# Patient Record
Sex: MALE | Race: OTHER
[De-identification: names, ages, dates, MRNs, and addresses within clinical notes are randomized per-mention and may not be internally consistent; named-entity substitution may affect disease eponyms.]

---

## 2018-01-29 NOTE — DIAGNOSTIC IMAGING REPORT
Indication: Reason For Exam: PREOP

 

Technique: 2 views of the chest

 

Comparison: none.

 

Findings: Lungs and pleural spaces are clear. Heart size is normal. Bones are

unremarkable..

 

Impression: No acute process

## 2018-02-01 ENCOUNTER — HOSPITAL ENCOUNTER (INPATIENT)
Dept: HOSPITAL 72 - SDSOVERFLO | Age: 63
LOS: 9 days | Discharge: HOME | DRG: 658 | End: 2018-02-10
Payer: COMMERCIAL

## 2018-02-01 VITALS — WEIGHT: 170 LBS | HEIGHT: 70 IN | BODY MASS INDEX: 24.34 KG/M2

## 2018-02-01 DIAGNOSIS — E27.9: ICD-10-CM

## 2018-02-01 DIAGNOSIS — C64.1: Primary | ICD-10-CM

## 2018-02-01 DIAGNOSIS — N40.0: ICD-10-CM

## 2018-02-01 PROCEDURE — 83735 ASSAY OF MAGNESIUM: CPT

## 2018-02-01 PROCEDURE — 87081 CULTURE SCREEN ONLY: CPT

## 2018-02-01 PROCEDURE — 86920 COMPATIBILITY TEST SPIN: CPT

## 2018-02-01 PROCEDURE — 85610 PROTHROMBIN TIME: CPT

## 2018-02-01 PROCEDURE — 80048 BASIC METABOLIC PNL TOTAL CA: CPT

## 2018-02-01 PROCEDURE — 81001 URINALYSIS AUTO W/SCOPE: CPT

## 2018-02-01 PROCEDURE — 86901 BLOOD TYPING SEROLOGIC RH(D): CPT

## 2018-02-01 PROCEDURE — 86900 BLOOD TYPING SEROLOGIC ABO: CPT

## 2018-02-01 PROCEDURE — 86850 RBC ANTIBODY SCREEN: CPT

## 2018-02-01 PROCEDURE — 36415 COLL VENOUS BLD VENIPUNCTURE: CPT

## 2018-02-01 PROCEDURE — 71046 X-RAY EXAM CHEST 2 VIEWS: CPT

## 2018-02-01 PROCEDURE — 84100 ASSAY OF PHOSPHORUS: CPT

## 2018-02-01 PROCEDURE — 85025 COMPLETE CBC W/AUTO DIFF WBC: CPT

## 2018-02-01 PROCEDURE — 94150 VITAL CAPACITY TEST: CPT

## 2018-02-01 PROCEDURE — 94003 VENT MGMT INPAT SUBQ DAY: CPT

## 2018-02-01 PROCEDURE — 80053 COMPREHEN METABOLIC PANEL: CPT

## 2018-02-01 PROCEDURE — 85730 THROMBOPLASTIN TIME PARTIAL: CPT

## 2018-02-05 LAB
ADD MANUAL DIFF: NO
APPEARANCE UR: CLEAR
APTT BLD: 32 SEC (ref 23–33)
APTT PPP: (no result) S
BASOPHILS NFR BLD AUTO: 0.5 % (ref 0–2)
EOSINOPHIL NFR BLD AUTO: 0.7 % (ref 0–3)
ERYTHROCYTE [DISTWIDTH] IN BLOOD BY AUTOMATED COUNT: 13 % (ref 11.6–14.8)
GLUCOSE UR STRIP-MCNC: NEGATIVE MG/DL
HCT VFR BLD CALC: 34.6 % (ref 42–52)
HGB BLD-MCNC: 11.1 G/DL (ref 14.2–18)
INR PPP: 1 (ref 0.9–1.1)
KETONES UR QL STRIP: NEGATIVE
LEUKOCYTE ESTERASE UR QL STRIP: NEGATIVE
LYMPHOCYTES NFR BLD AUTO: 18.5 % (ref 20–45)
MCV RBC AUTO: 88 FL (ref 80–99)
MONOCYTES NFR BLD AUTO: 9.9 % (ref 1–10)
NEUTROPHILS NFR BLD AUTO: 70.4 % (ref 45–75)
NITRITE UR QL STRIP: NEGATIVE
PH UR STRIP: 5 [PH] (ref 4.5–8)
PLATELET # BLD: 369 K/UL (ref 150–450)
PROT UR QL STRIP: NEGATIVE
RBC # BLD AUTO: 3.95 M/UL (ref 4.7–6.1)
SP GR UR STRIP: 1.01 (ref 1–1.03)
UROBILINOGEN UR-MCNC: NORMAL MG/DL (ref 0–1)
WBC # BLD AUTO: 4.9 K/UL (ref 4.8–10.8)

## 2018-02-06 LAB
ALBUMIN SERPL-MCNC: 3 G/DL (ref 3.4–5)
ALBUMIN/GLOB SERPL: 0.7 {RATIO} (ref 1–2.7)
ALP SERPL-CCNC: 75 U/L (ref 46–116)
ALT SERPL-CCNC: 15 U/L (ref 12–78)
ANION GAP SERPL CALC-SCNC: 8 MMOL/L (ref 5–15)
AST SERPL-CCNC: 16 U/L (ref 15–37)
BILIRUB SERPL-MCNC: 0.4 MG/DL (ref 0.2–1)
BUN SERPL-MCNC: 14 MG/DL (ref 7–18)
CALCIUM SERPL-MCNC: 9 MG/DL (ref 8.5–10.1)
CHLORIDE SERPL-SCNC: 102 MMOL/L (ref 98–107)
CO2 SERPL-SCNC: 29 MMOL/L (ref 21–32)
CREAT SERPL-MCNC: 0.9 MG/DL (ref 0.55–1.3)
GLOBULIN SER-MCNC: 4.6 G/DL
PHOSPHATE SERPL-MCNC: 3.2 MG/DL (ref 2.5–4.9)
POTASSIUM SERPL-SCNC: 4.3 MMOL/L (ref 3.5–5.1)
SODIUM SERPL-SCNC: 138 MMOL/L (ref 136–145)

## 2018-02-07 VITALS — DIASTOLIC BLOOD PRESSURE: 72 MMHG | SYSTOLIC BLOOD PRESSURE: 119 MMHG

## 2018-02-07 VITALS — SYSTOLIC BLOOD PRESSURE: 119 MMHG | DIASTOLIC BLOOD PRESSURE: 72 MMHG

## 2018-02-07 VITALS — SYSTOLIC BLOOD PRESSURE: 100 MMHG | DIASTOLIC BLOOD PRESSURE: 60 MMHG

## 2018-02-07 VITALS — SYSTOLIC BLOOD PRESSURE: 133 MMHG | DIASTOLIC BLOOD PRESSURE: 68 MMHG

## 2018-02-07 VITALS — DIASTOLIC BLOOD PRESSURE: 79 MMHG | SYSTOLIC BLOOD PRESSURE: 133 MMHG

## 2018-02-07 VITALS — DIASTOLIC BLOOD PRESSURE: 82 MMHG | SYSTOLIC BLOOD PRESSURE: 129 MMHG

## 2018-02-07 VITALS — SYSTOLIC BLOOD PRESSURE: 131 MMHG | DIASTOLIC BLOOD PRESSURE: 73 MMHG

## 2018-02-07 VITALS — DIASTOLIC BLOOD PRESSURE: 70 MMHG | SYSTOLIC BLOOD PRESSURE: 123 MMHG

## 2018-02-07 VITALS — SYSTOLIC BLOOD PRESSURE: 125 MMHG | DIASTOLIC BLOOD PRESSURE: 72 MMHG

## 2018-02-07 VITALS — SYSTOLIC BLOOD PRESSURE: 121 MMHG | DIASTOLIC BLOOD PRESSURE: 73 MMHG

## 2018-02-07 VITALS — SYSTOLIC BLOOD PRESSURE: 125 MMHG | DIASTOLIC BLOOD PRESSURE: 73 MMHG

## 2018-02-07 PROCEDURE — 0TT04ZZ RESECTION OF RIGHT KIDNEY, PERCUTANEOUS ENDOSCOPIC APPROACH: ICD-10-PCS

## 2018-02-07 PROCEDURE — 0GT34ZZ RESECTION OF RIGHT ADRENAL GLAND, PERCUTANEOUS ENDOSCOPIC APPROACH: ICD-10-PCS

## 2018-02-07 RX ADMIN — SODIUM CHLORIDE PRN MG: 9 INJECTION, SOLUTION INTRAVENOUS at 14:43

## 2018-02-07 RX ADMIN — DEXTROSE AND SODIUM CHLORIDE SCH MLS/HR: 5; .45 INJECTION, SOLUTION INTRAVENOUS at 14:43

## 2018-02-07 RX ADMIN — SODIUM CHLORIDE SCH MLS/HR: 0.9 INJECTION INTRAVENOUS at 17:15

## 2018-02-07 RX ADMIN — SODIUM CHLORIDE PRN MG: 9 INJECTION, SOLUTION INTRAVENOUS at 10:58

## 2018-02-07 NOTE — PRE-PROCEDURE NOTE/ATTESTATION
Pre-Procedure Note/Attestation


Complete Prior to Procedure


Planned Procedure:  right


Procedure Narrative:


laparoscopic versus open right radical nephrectomy





Indications for Procedure


Pre-Operative Diagnosis:


right renal mass





Attestation


I attest that I discussed the nature of the procedure; its benefits; risks and 

complications; and alternatives (and the risks and benefits of such alternatives

), prior to the procedure, with the patient (or the patient's legal 

representative).





I attest that, if there was a reasonable possibility of needing a blood 

transfusion, the patient (or the patient's legal representative) was given the 

[facility] standardized written summary, pursuant 

to the [place] Blood Safety Act ([place] Health and Safety Code # 1645, as 

amended).





I attest that I re-evaluated the patient just prior to the surgery and that 

there has been no change in the patient's H&P, except as documented below: n/a











JUAN AMADOR Feb 7, 2018 07:39

## 2018-02-07 NOTE — ANETHESIA PREOPERATIVE EVAL
Anesthesia Pre-op PMH/ROS


General


Date of Evaluation:  Feb 7, 2018


Time of Evaluation:  08:15


Anesthesiologist:  Jesse


ASA Score:  ASA 2


Mallampati Score


Class I : Soft palate, uvula, fauces, pillars visible


Class II: Soft palate, uvula, fauces visible


Class III: Soft palate, base of uvula visible


Class IV: Only hard plate visible


Mallampati Classification:  Class II


Surgeon:  Hugo


Diagnosis:  R renal mass


Surgical Procedure:  R laparoscopic nephrectomy


Anesthesia History:  none


Family History:  no anesthesia problems


Allergies:  


Coded Allergies:  


     No Known Allergies (Unverified , 2/7/18)


Medications:  see eMAR





Past Medical History


Cardiovascular:  Denies: HTN, CAD, MI, valve dz, arrhythmia, other


Pulmonary:  Denies: asthma, COPD, DONTRELL, other


Gastrointestinal/Genitourinary:  Reports: GERD, other - recent weight loss, 


   Denies: CRI, ESRD


Neurologic/Psychiatric:  Denies: dementia, CVA, depression/anxiety, TIA, other


Endocrine:  Denies: DM, hypothyroidism, steroids, other


HEENT:  Denies: cataract (L), cataract (R), glaucoma, [place] (L), [place] (R), other


Hematology/Immune:  Reports: anemia - mild, 


   Denies: DVT, bleeding disorder, other


Musculoskeletal/Integumentary:  Denies: OA, RA, DJD, DDD, edema, other


PMH Narrative:


macrohematuria r kidney mass on CT


PSxH Narrative:


bilateral hernia repair





Anesthesia Pre-op Phys. Exam


Physician Exam





Last Vital Signs








  Date Time  Temp Pulse Resp B/P (MAP) Pulse Ox O2 Delivery O2 Flow Rate FiO2


 


2/7/18 07:03 98.3 79 17 129/82 99 Room Air  








Constitutional:  NAD


Neurologic:  CN 2-12 intact


Cardiovascular:  RRR, no M/R/G


Respiratory:  CTA


Gastrointestinal:  S/NT/ND





Airway Exam


Mallampati Score:  Class II


Neck:  flexible


ROM:  full


Teeth:  intact


Dentures:  no upper, no lower





Anesthesia Pre-op A/P


Labs





Chemistry








Test


  2/6/18


15:45


 


Sodium Level


  138 MMOL/L


(136-145)


 


Potassium Level


  4.3 MMOL/L


(3.5-5.1)


 


Chloride Level


  102 MMOL/L


()


 


Carbon Dioxide Level


  29 MMOL/L


(21-32)


 


Anion Gap


  8 mmol/L


(5-15)


 


Blood Urea Nitrogen


  14 mg/dL


(7-18)


 


Creatinine


  0.9 MG/DL


(0.55-1.30)


 


Estimat Glomerular Filtration


Rate > 60 mL/min


(>60)


 


Glucose Level


  88 MG/DL


()


 


Calcium Level


  9.0 MG/DL


(8.5-10.1)


 


Phosphorus Level


  3.2 MG/DL


(2.5-4.9)


 


Magnesium Level


  2.0 MG/DL


(1.8-2.4)


 


Total Bilirubin


  0.4 MG/DL


(0.2-1.0)


 


Aspartate Amino Transf


(AST/SGOT) 16 U/L (15-37)


 


 


Alanine Aminotransferase


(ALT/SGPT) 15 U/L (12-78)


 


 


Alkaline Phosphatase


  75 U/L


()


 


Total Protein


  7.6 G/DL


(6.4-8.2)


 


Albumin


  3.0 G/DL


(3.4-5.0)  L


 


Globulin 4.6 g/dL  


 


Albumin/Globulin Ratio


  0.7 (1.0-2.7)


L











Studies


Pre-op Studies:  EKG - NSR, CXR - WNL





Risk Assessment & Plan


Assessment:


ASA 2


Plan:


GA with ETT


Status Change Before Surgery:  No





Pre-Antibiotics


Drug:  Ancef 2gr.


Given Within 1 Hr of Incision:  Yes


Time Given:  08:50











KATLIN BERRY M.D. Feb 7, 2018 08:20

## 2018-02-07 NOTE — IMMEDIATE POST-OP EVALUATION
Immediate Post-Op Evalulation


Immediate Post-Op Evalulation


Procedure:  Laparoscopic hand assysted R nephrectomy


Date of Evaluation:  Feb 7, 2018


Time of Evaluation:  10:31


IV Fluids:  1300


Blood Products:  none


Estimated Blood Loss:  50


Urinary Output:  150


Blood Pressure Systolic:  127


Blood Pressure Diastolic:  72


Pulse Rate:  86


Respiratory Rate:  20


O2 Sat by Pulse Oximetry:  99


Temperature (Fahrenheit):  98.3


Pain Score (1-10):  2


Nausea:  No


Vomiting:  No


Complications


none


Patient Status:  reacts, patent, extubated, none


Hydration Status:  adequate











KATLIN BERRY M.D. Feb 7, 2018 10:32

## 2018-02-07 NOTE — BRIEF OPERATIVE NOTE
Immediate Post Operative Note


Operative Note


Pre-op Diagnosis:


right renal mass


Procedure:


laparoscopic hand-assisted right radical nephrectomy


Post-op Diagnosis:  same as pre-op


Surgeon:  maría elena


Assistant:  verito


Anesthesiologist:  preston


Anesthesia:  general


Specimen:  yes - right kidney and adrenal


Complications:  none


Condition:  stable


Fluids:  NS


Estimated Blood Loss:  volume - 30 cc


Drains:  none


Implant(s) used?:  No











JUAN AMADOR Feb 7, 2018 10:14

## 2018-02-07 NOTE — UROLOGY PROGRESS NOTE
Assessment/Plan


Assessment/Plan


hematuria hx


BPH hx


right renal mass, r/o RCC





plan surg today


lap vs open right radical nephrectomy


d/w pt fully


risks, etc


pt knows Dr. Dawkins will be co-surgeon


all questions answered





Subjective


Allergies:  


Coded Allergies:  


     No Known Allergies (Unverified , 2/7/18)


Subjective


plan surg today





Objective





Last 24 Hour Vital Signs








  Date Time  Temp Pulse Resp B/P (MAP) Pulse Ox O2 Delivery O2 Flow Rate FiO2


 


2/7/18 07:03 98.3 79 17 129/82 99 Room Air  








Laboratory Tests


2/6/18 15:45: 


Sodium Level 138, Potassium Level 4.3, Chloride Level 102, Carbon Dioxide Level 

29, Anion Gap 8, Blood Urea Nitrogen 14, Creatinine 0.9, Estimat Glomerular 

Filtration Rate > 60, Glucose Level 88, Calcium Level 9.0, Phosphorus Level 3.2

, Magnesium Level 2.0, Total Bilirubin 0.4, Aspartate Amino Transf (AST/SGOT) 16

, Alanine Aminotransferase (ALT/SGPT) 15, Alkaline Phosphatase 75, Total 

Protein 7.6, Albumin 3.0L, Globulin 4.6, Albumin/Globulin Ratio 0.7L


Height (Feet):  5


Height (Inches):  10.00


Weight (Pounds):  170


Objective


 exam stable











JUAN AMADOR Feb 7, 2018 07:37

## 2018-02-08 VITALS — DIASTOLIC BLOOD PRESSURE: 65 MMHG | SYSTOLIC BLOOD PRESSURE: 113 MMHG

## 2018-02-08 VITALS — DIASTOLIC BLOOD PRESSURE: 68 MMHG | SYSTOLIC BLOOD PRESSURE: 105 MMHG

## 2018-02-08 VITALS — DIASTOLIC BLOOD PRESSURE: 78 MMHG | SYSTOLIC BLOOD PRESSURE: 123 MMHG

## 2018-02-08 VITALS — DIASTOLIC BLOOD PRESSURE: 72 MMHG | SYSTOLIC BLOOD PRESSURE: 123 MMHG

## 2018-02-08 VITALS — SYSTOLIC BLOOD PRESSURE: 135 MMHG | DIASTOLIC BLOOD PRESSURE: 77 MMHG

## 2018-02-08 VITALS — DIASTOLIC BLOOD PRESSURE: 80 MMHG | SYSTOLIC BLOOD PRESSURE: 124 MMHG

## 2018-02-08 LAB
ADD MANUAL DIFF: NO
ANION GAP SERPL CALC-SCNC: 5 MMOL/L (ref 5–15)
BASOPHILS NFR BLD AUTO: 0.2 % (ref 0–2)
BUN SERPL-MCNC: 14 MG/DL (ref 7–18)
CALCIUM SERPL-MCNC: 8.5 MG/DL (ref 8.5–10.1)
CHLORIDE SERPL-SCNC: 102 MMOL/L (ref 98–107)
CO2 SERPL-SCNC: 28 MMOL/L (ref 21–32)
CREAT SERPL-MCNC: 1.2 MG/DL (ref 0.55–1.3)
EOSINOPHIL NFR BLD AUTO: 0.2 % (ref 0–3)
ERYTHROCYTE [DISTWIDTH] IN BLOOD BY AUTOMATED COUNT: 12.7 % (ref 11.6–14.8)
HCT VFR BLD CALC: 30.2 % (ref 42–52)
HGB BLD-MCNC: 10.2 G/DL (ref 14.2–18)
LYMPHOCYTES NFR BLD AUTO: 10 % (ref 20–45)
MCV RBC AUTO: 86 FL (ref 80–99)
MONOCYTES NFR BLD AUTO: 6.9 % (ref 1–10)
NEUTROPHILS NFR BLD AUTO: 82.6 % (ref 45–75)
PLATELET # BLD: 344 K/UL (ref 150–450)
POTASSIUM SERPL-SCNC: 4.1 MMOL/L (ref 3.5–5.1)
RBC # BLD AUTO: 3.49 M/UL (ref 4.7–6.1)
SODIUM SERPL-SCNC: 135 MMOL/L (ref 136–145)
WBC # BLD AUTO: 6.6 K/UL (ref 4.8–10.8)

## 2018-02-08 RX ADMIN — SODIUM CHLORIDE PRN MG: 9 INJECTION, SOLUTION INTRAVENOUS at 12:23

## 2018-02-08 RX ADMIN — DEXTROSE AND SODIUM CHLORIDE SCH MLS/HR: 5; .45 INJECTION, SOLUTION INTRAVENOUS at 01:14

## 2018-02-08 RX ADMIN — SODIUM CHLORIDE PRN MG: 9 INJECTION, SOLUTION INTRAVENOUS at 19:38

## 2018-02-08 RX ADMIN — DEXTROSE AND SODIUM CHLORIDE SCH MLS/HR: 5; .45 INJECTION, SOLUTION INTRAVENOUS at 20:45

## 2018-02-08 RX ADMIN — SODIUM CHLORIDE PRN MG: 9 INJECTION, SOLUTION INTRAVENOUS at 07:18

## 2018-02-08 RX ADMIN — SODIUM CHLORIDE SCH MLS/HR: 0.9 INJECTION INTRAVENOUS at 01:30

## 2018-02-08 RX ADMIN — SODIUM CHLORIDE SCH MLS/HR: 0.9 INJECTION INTRAVENOUS at 08:16

## 2018-02-08 RX ADMIN — SODIUM CHLORIDE SCH MLS/HR: 0.9 INJECTION INTRAVENOUS at 17:05

## 2018-02-08 RX ADMIN — DEXTROSE AND SODIUM CHLORIDE SCH MLS/HR: 5; .45 INJECTION, SOLUTION INTRAVENOUS at 10:58

## 2018-02-08 NOTE — UROLOGY PROGRESS NOTE
Assessment/Plan


Assessment/Plan


hematuria hx


BPH hx


right renal mass, r/o RCC


POD # 1, lap right radical nephrectomy





monitor clinically


clears


marjorie lizarraga





Subjective


Allergies:  


Coded Allergies:  


     No Known Allergies (Unverified , 2/7/18)


Subjective


plan surg today





Objective





Last 24 Hour Vital Signs








  Date Time  Temp Pulse Resp B/P (MAP) Pulse Ox O2 Delivery O2 Flow Rate FiO2


 


2/8/18 09:11  78 22  98   


 


2/8/18 04:31 98.3 68 18 123/72 99   


 


2/8/18 00:36 97.2 77 18 113/65 99   


 


2/7/18 20:14 98.4 56 19 100/60 99   


 


2/7/18 15:58      Nasal Cannula 3.0 


 


2/7/18 15:58 98.4 62 20 119/72 98   


 


2/7/18 15:31 97.8       


 


2/7/18 14:51 97.8       


 


2/7/18 14:00 98.4 62 20 119/72 98   


 


2/7/18 14:00      Nasal Cannula 3.0 


 


2/7/18 13:00 97.6 61 20 131/73 100   


 


2/7/18 13:00      Nasal Cannula 3.0 


 


2/7/18 11:58      Nasal Cannula 3.0 


 


2/7/18 11:58 97.8 63 20 133/79 100   


 


2/7/18 11:17 98.6 66 20 123/70 100 Nasal Cannula 3.0 


 


2/7/18 11:10  60 20 125/73 100 Nasal Cannula 3.0 


 


2/7/18 10:58  65 20 121/73 100 Nasal Cannula 3.0 


 


2/7/18 10:44  65 20 125/72 100 Nasal Cannula 3.0 


 


2/7/18 10:39  64 20 125/72 100 Simple Mask 8.0 


 


2/7/18 10:34 98.3 68 20 133/68 97 Simple Mask 8.0 


 


2/7/18 10:32  86 20  99   

















Intake and Output  


 


 2/7/18 2/8/18





 19:00 07:00


 


Intake Total 1810 ml 


 


Output Total 250 ml 975 ml


 


Balance 1560 ml -975 ml


 


  


 


Intake IV Total 1810 ml 


 


Output Urine Total 200 ml 975 ml


 


Estimated Blood Loss 50 ml 











Current Medications








 Medications


  (Trade)  Dose


 Ordered  Sig/Asmita


 Route


 PRN Reason  Start Time


 Stop Time Status Last Admin


Dose Admin


 


 Cefazolin Sodium


 1 gm/Sodium


 Chloride  55 ml @ 


 110 mls/hr  Q8H


 IVP


   2/7/18 17:00


 2/14/18 16:59  2/8/18 08:16


 


 


 Dextrose/Sodium


 Chloride  1,000 ml @ 


 100 mls/hr  Q10H


 IV


   2/7/18 14:45


 3/9/18 14:44  2/8/18 01:14


 


 


 Hydromorphone HCl


  (Dilaudid)  0.5 mg  Q3HR  PRN


 IVP


 For Pain  2/7/18 14:15


 2/14/18 14:14  2/8/18 07:18


 





Laboratory Tests


2/8/18 05:54: 


White Blood Count 6.6, Red Blood Count 3.49L, Hemoglobin 10.2L, Hematocrit 30.2L

, Mean Corpuscular Volume 86, Mean Corpuscular Hemoglobin 29.2, Mean 

Corpuscular Hemoglobin Concent 33.8, Red Cell Distribution Width 12.7, Platelet 

Count 344, Mean Platelet Volume 7.0, Neutrophils (%) (Auto) 82.6H, Lymphocytes (

%) (Auto) 10.0L, Monocytes (%) (Auto) 6.9, Eosinophils (%) (Auto) 0.2, 

Basophils (%) (Auto) 0.2, Sodium Level 135L, Potassium Level 4.1, Chloride 

Level 102, Carbon Dioxide Level 28, Anion Gap 5, Blood Urea Nitrogen 14, 

Creatinine 1.2, Estimat Glomerular Filtration Rate > 60, Glucose Level 119H, 

Calcium Level 8.5


Height (Feet):  5


Height (Inches):  10.00


Weight (Pounds):  170


Objective


abdomen soft, ND











BAMSHAD,JUAN Feb 8, 2018 10:10

## 2018-02-08 NOTE — INTERNAL MED PROGRESS NOTE
Subjective


Date of Service:  Feb 8, 2018


Physician Name


Carina Hale


Attending Physician


Robin Arias





Current Medications








 Medications


  (Trade)  Dose


 Ordered  Sig/Asmita


 Route


 PRN Reason  Start Time


 Stop Time Status Last Admin


Dose Admin


 


 Cefazolin Sodium


 1 gm/Sodium


 Chloride  55 ml @ 


 110 mls/hr  Q8H


 IVP


   2/7/18 17:00


 2/14/18 16:59  2/8/18 08:16


 


 


 Dextrose/Sodium


 Chloride  1,000 ml @ 


 100 mls/hr  Q10H


 IV


   2/7/18 14:45


 3/9/18 14:44  2/8/18 10:58


 


 


 Hydromorphone HCl


  (Dilaudid)  0.5 mg  Q3HR  PRN


 IVP


 For Pain  2/7/18 14:15


 2/14/18 14:14  2/8/18 12:23


 








Allergies:  


Coded Allergies:  


     No Known Allergies (Unverified , 2/7/18)


ROS Limited/Unobtainable:  No


Constitutional:  Reports: no symptoms


HEENT:  Reports: no symptoms


Cardiovascular:  Reports: no symptoms


Respiratory:  Reports: no symptoms


Gastrointestinal/Abdominal:  Reports: no symptoms


Genitourinary:  Reports: no symptoms


Neurologic/Psychiatric:  Reports: no symptoms


Subjective


64 YO M with history of right renal mass; S/P laparoscopic right radical 

nephrectomy.  Cover for Int [facility]-Dr Morris





Objective





Last Vital Signs








  Date Time  Temp Pulse Resp B/P (MAP) Pulse Ox O2 Delivery O2 Flow Rate FiO2


 


2/8/18 12:00 98.4 69 19 123/78 100   


 


2/7/18 15:58      Nasal Cannula 3.0 








General Appearance:  WD/WN, no apparent distress, mild distress


EENT:  PERRL/EOMI, normal ENT inspection, TMs normal


Neck:  non-tender, normal alignment, supple, normal inspection


Cardiovascular:  normal peripheral pulses, normal rate, regular rhythm, no 

gallop/murmur, no JVD


Respiratory/Chest:  chest wall non-tender, lungs clear, normal breath sounds, 

no respiratory distress, no accessory muscle use


Abdomen:  no organomegaly, no mass, decreased bowel sounds, tender


Extremities:  normal range of motion, non-tender


Edema:  trace edema


Neurologic:  CNs II-XII grossly normal, no motor/sensory deficits


Skin:  normal pigmentation, warm/dry





Laboratory Tests








Test


  2/8/18


05:54


 


White Blood Count


  6.6 K/UL


(4.8-10.8)


 


Red Blood Count


  3.49 M/UL


(4.70-6.10)  L


 


Hemoglobin


  10.2 G/DL


(14.2-18.0)  L


 


Hematocrit


  30.2 %


(42.0-52.0)  L


 


Mean Corpuscular Volume 86 FL (80-99)  


 


Mean Corpuscular Hemoglobin


  29.2 PG


(27.0-31.0)


 


Mean Corpuscular Hemoglobin


Concent 33.8 G/DL


(32.0-36.0)


 


Red Cell Distribution Width


  12.7 %


(11.6-14.8)


 


Platelet Count


  344 K/UL


(150-450)


 


Mean Platelet Volume


  7.0 FL


(6.5-10.1)


 


Neutrophils (%) (Auto)


  82.6 %


(45.0-75.0)  H


 


Lymphocytes (%) (Auto)


  10.0 %


(20.0-45.0)  L


 


Monocytes (%) (Auto)


  6.9 %


(1.0-10.0)


 


Eosinophils (%) (Auto)


  0.2 %


(0.0-3.0)


 


Basophils (%) (Auto)


  0.2 %


(0.0-2.0)


 


Sodium Level


  135 MMOL/L


(136-145)  L


 


Potassium Level


  4.1 MMOL/L


(3.5-5.1)


 


Chloride Level


  102 MMOL/L


()


 


Carbon Dioxide Level


  28 MMOL/L


(21-32)


 


Anion Gap


  5 mmol/L


(5-15)


 


Blood Urea Nitrogen


  14 mg/dL


(7-18)


 


Creatinine


  1.2 MG/DL


(0.55-1.30)


 


Estimat Glomerular Filtration


Rate > 60 mL/min


(>60)


 


Glucose Level


  119 MG/DL


()  H


 


Calcium Level


  8.5 MG/DL


(8.5-10.1)

















Intake and Output  


 


 2/7/18 2/8/18





 19:00 07:00


 


Intake Total 1810 ml 


 


Output Total 250 ml 975 ml


 


Balance 1560 ml -975 ml


 


  


 


Intake IV Total 1810 ml 


 


Output Urine Total 200 ml 975 ml


 


Estimated Blood Loss 50 ml 











Assessment/Plan


Problem List:  


(1) Hematuria


(2) BPH (benign prostatic hyperplasia)


(3) Renal mass


Assessment & Plan:  S/P laparoscopic right radical nephrectomy-see urology note.





Status:  tolerating diet - clear liquid











CARINA HALE Feb 8, 2018 16:43

## 2018-02-08 NOTE — 48 HOUR POST ANESTHESIA EVAL
Post Anesthesia Evaluation


Procedure:  Laparoscopic hand assysted R nephrectomy


Date of Evaluation:  Feb 8, 2018


Time of Evaluation:  09:10


Blood Pressure Systolic:  116


0:  72


Pulse Rate:  78


Respiratory Rate:  22


Temperature (Fahrenheit):  97.6


O2 Sat by Pulse Oximetry:  98


Airway:  patent


Nausea:  No


Vomiting:  No


Pain Intensity:  3


Hydration Status:  adequate


Cardiopulmonary Status:


stable


Mental Status/LOC:  patient returned to baseline


Follow-up Care/Observations:


n/a


Post-Anesthesia Complications:


none


Follow-up care needed:  N/A











KATLIN BERRY M.D. Feb 8, 2018 09:11

## 2018-02-08 NOTE — OPERATIVE NOTE - DICTATED
DATE OF OPERATION:  02/07/2018



SURGEON:  Robin Arias M.D.



PREOPERATIVE DIAGNOSIS:  Right renal mass, rule out renal cell carcinoma.



POSTOPERATIVE DIAGNOSIS:  Right renal mass, rule out renal cell

carcinoma.



PROCEDURE PERFORMED:  Laparoscopic hand assisted right radical

nephrectomy.



OPERATING SURGEON:  Robin Arias M.D.



CO-SURGEON:  Mandeep Dawkins M.D.



ANESTHESIOLOGIST:  Ernesto Molina M.D.



ANESTHESIA:  General.



INDICATION FOR PROCEDURE:  This is a pleasant 63-year-old male.  He had

history of gross hematuria.  He had a recent workup, which showed evidence

of two renal masses on the right side, which were worrisome for carcinoma.

As such, the above procedure was recommended.  The nature of the

procedure including possible risks and complications of bleeding,

infection, anesthesia, damage to internal organs, need for further surgery

were thoroughly discussed.  I did tell the patient that there is a very

small chance that the tumors may be benign, but that still the

recommendation would to have them excised.  He has had medical clearance

and the patient wants to proceed.  All of his questions were answered.



FINDINGS:  Right radical nephrectomy was performed.



PROCEDURE IN DETAIL:  Informed consent was obtained from the patient.  The

patient was brought to the operating room and laid the patient in supine

position.  Successful general anesthesia was induced.  The patient was

then placed in a modified lateral decubitus position with his right

abdomen and flank exposed.  All pressure areas were padded.  A small

midline incision was made on the umbilicus.  The subcutaneous tissue and

the fascia was opened, and the peritoneal cavity was then entered

carefully not to injure bowel.  The hand port was then placed.  At this

point, with the hand in the belly, two 12 mm trocars were inserted as was

the 5 mm in the upper abdomen.  The abdomen was insufflated.  Using the

laparoscope, the inside of the belly was then visualized.  At this point,

using the  Endo Jose.  The kidney was carefully mobilized all around

laterally, superiorly, as well as inferiorly.  The ureter and the gonadal

vessels were identified and were ligated using a LADY stapler.  The hilum

was then identified and the small vessels were individually clipped.  The

renal vein was identified as well as the renal artery, and these were then

carefully skeletonized and were eventually ligated with the LADY vascular

stapler.  There was minimal bleeding.  The above part of the adrenal gland

was also excised.  Once the specimen was completely free, it was removed

through the hand port.  We then used insufflation to look back in the

surgical fossa area and there was no significant bleeding noted.  There

was very minimal oozing, which was cauterized.  Surgicel was then placed

in the bed of the kidney fossa.  The bowel appeared intact as was the

liver.  No other abnormalities were noted.  The _____ was irrigated.  All

the laps and sponges were removed.  The sponge and lap counts were

correct.  At this point, the lower abdominal incision was then closed with

a running Vicryl stitch and the skin incision was closed with the staples.

The patient was awakened and taken to the recovery room in stable

condition.  Blood loss was minimal.  No complication.









  ______________________________________________

  Robin Arias M.D.





DR:  ENE

D:  02/07/2018 20:19

T:  02/07/2018 23:10

JOB#:  [id number]

CC:

## 2018-02-09 VITALS — SYSTOLIC BLOOD PRESSURE: 136 MMHG | DIASTOLIC BLOOD PRESSURE: 87 MMHG

## 2018-02-09 VITALS — DIASTOLIC BLOOD PRESSURE: 76 MMHG | SYSTOLIC BLOOD PRESSURE: 150 MMHG

## 2018-02-09 VITALS — DIASTOLIC BLOOD PRESSURE: 88 MMHG | SYSTOLIC BLOOD PRESSURE: 142 MMHG

## 2018-02-09 VITALS — SYSTOLIC BLOOD PRESSURE: 136 MMHG | DIASTOLIC BLOOD PRESSURE: 81 MMHG

## 2018-02-09 VITALS — DIASTOLIC BLOOD PRESSURE: 90 MMHG | SYSTOLIC BLOOD PRESSURE: 139 MMHG

## 2018-02-09 VITALS — DIASTOLIC BLOOD PRESSURE: 92 MMHG | SYSTOLIC BLOOD PRESSURE: 137 MMHG

## 2018-02-09 LAB
ADD MANUAL DIFF: NO
ANION GAP SERPL CALC-SCNC: 4 MMOL/L (ref 5–15)
BASOPHILS NFR BLD AUTO: 0.5 % (ref 0–2)
BUN SERPL-MCNC: 9 MG/DL (ref 7–18)
CALCIUM SERPL-MCNC: 8.9 MG/DL (ref 8.5–10.1)
CHLORIDE SERPL-SCNC: 97 MMOL/L (ref 98–107)
CO2 SERPL-SCNC: 31 MMOL/L (ref 21–32)
CREAT SERPL-MCNC: 1.2 MG/DL (ref 0.55–1.3)
EOSINOPHIL NFR BLD AUTO: 0.5 % (ref 0–3)
ERYTHROCYTE [DISTWIDTH] IN BLOOD BY AUTOMATED COUNT: 12.6 % (ref 11.6–14.8)
HCT VFR BLD CALC: 34.1 % (ref 42–52)
HGB BLD-MCNC: 11.4 G/DL (ref 14.2–18)
LYMPHOCYTES NFR BLD AUTO: 9.5 % (ref 20–45)
MCV RBC AUTO: 86 FL (ref 80–99)
MONOCYTES NFR BLD AUTO: 7.8 % (ref 1–10)
NEUTROPHILS NFR BLD AUTO: 81.8 % (ref 45–75)
PLATELET # BLD: 390 K/UL (ref 150–450)
POTASSIUM SERPL-SCNC: 4.2 MMOL/L (ref 3.5–5.1)
RBC # BLD AUTO: 3.97 M/UL (ref 4.7–6.1)
SODIUM SERPL-SCNC: 132 MMOL/L (ref 136–145)
WBC # BLD AUTO: 6.4 K/UL (ref 4.8–10.8)

## 2018-02-09 RX ADMIN — SODIUM CHLORIDE SCH MLS/HR: 0.9 INJECTION INTRAVENOUS at 09:00

## 2018-02-09 RX ADMIN — SODIUM CHLORIDE SCH MLS/HR: 0.9 INJECTION INTRAVENOUS at 01:35

## 2018-02-09 RX ADMIN — SODIUM CHLORIDE SCH MLS/HR: 0.9 INJECTION INTRAVENOUS at 17:28

## 2018-02-09 RX ADMIN — DEXTROSE AND SODIUM CHLORIDE SCH MLS/HR: 5; .45 INJECTION, SOLUTION INTRAVENOUS at 06:46

## 2018-02-09 NOTE — INTERNAL MED PROGRESS NOTE
Subjective


Physician Name


Derik Hale


Attending Physician


Robin Arias





Current Medications








 Medications


  (Trade)  Dose


 Ordered  Sig/Asmita


 Route


 PRN Reason  Start Time


 Stop Time Status Last Admin


Dose Admin


 


 Cefazolin Sodium


 1 gm/Sodium


 Chloride  55 ml @ 


 110 mls/hr  Q8H


 IVP


   2/7/18 17:00


 2/14/18 16:59  2/9/18 17:28


 


 


 Finasteride


  (Proscar)  5 mg  DAILY


 ORAL


   2/9/18 09:00


 3/11/18 08:59  2/9/18 09:00


 


 


 Hydromorphone HCl


  (Dilaudid)  0.5 mg  Q3HR  PRN


 IVP


 For Pain  2/7/18 14:15


 2/14/18 14:14  2/8/18 19:38


 


 


 Temazepam


  (Restoril)  15 mg  HSPRN  PRN


 ORAL


 Insomnia  2/8/18 16:45


 2/15/18 16:44   


 








Allergies:  


Coded Allergies:  


     No Known Allergies (Unverified , 2/7/18)


Subjective


62 YO M with history of right renal mass; S/P laparoscopic right radical 

nephrectomy.  Cover for Int Med-Dr Morris





Objective





Last Vital Signs








  Date Time  Temp Pulse Resp B/P (MAP) Pulse Ox O2 Delivery O2 Flow Rate FiO2


 


2/9/18 16:00 98.4 79 19 136/87 100   


 


2/9/18 00:45      Room Air  


 


2/7/18 15:58       3.0 











Laboratory Tests








Test


  2/9/18


07:40


 


White Blood Count


  6.4 K/UL


(4.8-10.8)


 


Red Blood Count


  3.97 M/UL


(4.70-6.10)  L


 


Hemoglobin


  11.4 G/DL


(14.2-18.0)  L


 


Hematocrit


  34.1 %


(42.0-52.0)  L


 


Mean Corpuscular Volume 86 FL (80-99)  


 


Mean Corpuscular Hemoglobin


  28.7 PG


(27.0-31.0)


 


Mean Corpuscular Hemoglobin


Concent 33.4 G/DL


(32.0-36.0)


 


Red Cell Distribution Width


  12.6 %


(11.6-14.8)


 


Platelet Count


  390 K/UL


(150-450)


 


Mean Platelet Volume


  7.0 FL


(6.5-10.1)


 


Neutrophils (%) (Auto)


  81.8 %


(45.0-75.0)  H


 


Lymphocytes (%) (Auto)


  9.5 %


(20.0-45.0)  L


 


Monocytes (%) (Auto)


  7.8 %


(1.0-10.0)


 


Eosinophils (%) (Auto)


  0.5 %


(0.0-3.0)


 


Basophils (%) (Auto)


  0.5 %


(0.0-2.0)


 


Sodium Level


  132 MMOL/L


(136-145)  L


 


Potassium Level


  4.2 MMOL/L


(3.5-5.1)


 


Chloride Level


  97 MMOL/L


()  L


 


Carbon Dioxide Level


  31 MMOL/L


(21-32)


 


Anion Gap


  4 mmol/L


(5-15)  L


 


Blood Urea Nitrogen


  9 mg/dL (7-18)


 


 


Creatinine


  1.2 MG/DL


(0.55-1.30)


 


Estimat Glomerular Filtration


Rate > 60 mL/min


(>60)


 


Glucose Level


  110 MG/DL


()  H


 


Calcium Level


  8.9 MG/DL


(8.5-10.1)











Microbiology








 Date/Time


Source Procedure


Growth Status


 


 


 2/7/18 06:30


Nasal Nares MRSA Culture - Final


NO METHICILLIN RESISTANT STAPH AUREUS... Complete

















Intake and Output  


 


 2/8/18 2/9/18





 19:00 07:00


 


Intake Total 1670 ml 840 ml


 


Balance 1670 ml 840 ml


 


  


 


Intake Oral 360 ml 840 ml


 


IV Total 1310 ml 


 


# Voids 2 2








Objective


General Appearance:  WD/WN, no apparent distress, mild distress


EENT:  PERRL/EOMI, normal ENT inspection, TMs normal


Neck:  non-tender, normal alignment, supple, normal inspection


Cardiovascular:  normal peripheral pulses, normal rate, regular rhythm, no 

gallop/murmur, no JVD


Respiratory/Chest:  chest wall non-tender, lungs clear, normal breath sounds, 

no respiratory distress, no accessory muscle use


Abdomen:  no organomegaly, no mass, decreased bowel sounds, tender


Extremities:  normal range of motion, non-tender


Edema:  trace edema


Neurologic:  CNs II-XII grossly normal, no motor/sensory deficits


Skin:  normal pigmentation, warm/dry





Assessment/Plan


Problem List:  


(1) Hematuria


(2) BPH (benign prostatic hyperplasia)


(3) Renal mass


Assessment & Plan:  S/P laparoscopic right radical nephrectomy-see urology note.














DERIK HALE Feb 9, 2018 19:15

## 2018-02-09 NOTE — UROLOGY PROGRESS NOTE
Assessment/Plan


Assessment/Plan


hematuria hx


BPH hx


right renal mass, r/o RCC


POD # 2, lap right radical nephrectomy





monitor clinically


advance diet to reg


f/u on labs and path


dc planning





Subjective


Allergies:  


Coded Allergies:  


     No Known Allergies (Unverified , 2/7/18)


Subjective


doing ok, al clears, voided, ambulated





Objective





Last 24 Hour Vital Signs








  Date Time  Temp Pulse Resp B/P (MAP) Pulse Ox O2 Delivery O2 Flow Rate FiO2


 


2/9/18 04:00 97.6 73 18 150/76 99   


 


2/9/18 00:45      Room Air  


 


2/9/18 00:45 98.6 78 17 142/88 98   


 


2/8/18 20:36      Room Air  


 


2/8/18 20:36 98.5 80 18 124/80 98   


 


2/8/18 16:00 98.6 72 19 135/77 99   


 


2/8/18 16:00      Room Air  


 


2/8/18 12:00      Room Air  


 


2/8/18 12:00 98.4 69 19 123/78 100   


 


2/8/18 09:11  78 22  98   

















Intake and Output  


 


 2/8/18 2/9/18





 19:00 07:00


 


Intake Total 1670 ml 840 ml


 


Balance 1670 ml 840 ml


 


  


 


Intake Oral 360 ml 840 ml


 


IV Total 1310 ml 


 


# Voids 2 2











Current Medications








 Medications


  (Trade)  Dose


 Ordered  Sig/Asmita


 Route


 PRN Reason  Start Time


 Stop Time Status Last Admin


Dose Admin


 


 Cefazolin Sodium


 1 gm/Sodium


 Chloride  55 ml @ 


 110 mls/hr  Q8H


 IVP


   2/7/18 17:00


 2/14/18 16:59  2/9/18 01:35


 


 


 Dextrose/Sodium


 Chloride  1,000 ml @ 


 100 mls/hr  Q10H


 IV


   2/7/18 14:45


 3/9/18 14:44  2/9/18 06:46


 


 


 Finasteride


  (Proscar)  5 mg  DAILY


 ORAL


   2/9/18 09:00


 3/11/18 08:59   


 


 


 Hydromorphone HCl


  (Dilaudid)  0.5 mg  Q3HR  PRN


 IVP


 For Pain  2/7/18 14:15


 2/14/18 14:14  2/8/18 19:38


 


 


 Temazepam


  (Restoril)  15 mg  HSPRN  PRN


 ORAL


 Insomnia  2/8/18 16:45


 2/15/18 16:44   


 





Laboratory Tests


2/9/18 07:40: 


White Blood Count [Pending], Red Blood Count [Pending], Hemoglobin [Pending], 

Hematocrit [Pending], Mean Corpuscular Volume [Pending], Mean Corpuscular 

Hemoglobin [Pending], Mean Corpuscular Hemoglobin Concent [Pending], Red Cell 

Distribution Width [Pending], Platelet Count [Pending], Mean Platelet Volume [

Pending], Neutrophils (%) (Auto) [Pending], Lymphocytes (%) (Auto) [Pending], 

Monocytes (%) (Auto) [Pending], Eosinophils (%) (Auto) [Pending], Basophils (%) 

(Auto) [Pending], Sodium Level [Pending], Potassium Level [Pending], Chloride 

Level [Pending], Carbon Dioxide Level [Pending], Blood Urea Nitrogen [Pending], 

Creatinine [Pending], Estimat Glomerular Filtration Rate [Pending], Glucose 

Level [Pending], Calcium Level [Pending]


Height (Feet):  5


Height (Inches):  10.00


Weight (Pounds):  170


Objective


abdomen soft, ND, wounds clean











BAMSHAD,JUAN Feb 9, 2018 08:24

## 2018-02-10 VITALS — SYSTOLIC BLOOD PRESSURE: 130 MMHG | DIASTOLIC BLOOD PRESSURE: 74 MMHG

## 2018-02-10 VITALS — SYSTOLIC BLOOD PRESSURE: 113 MMHG | DIASTOLIC BLOOD PRESSURE: 74 MMHG

## 2018-02-10 VITALS — DIASTOLIC BLOOD PRESSURE: 81 MMHG | SYSTOLIC BLOOD PRESSURE: 116 MMHG

## 2018-02-10 VITALS — SYSTOLIC BLOOD PRESSURE: 117 MMHG | DIASTOLIC BLOOD PRESSURE: 74 MMHG

## 2018-02-10 VITALS — DIASTOLIC BLOOD PRESSURE: 74 MMHG | SYSTOLIC BLOOD PRESSURE: 113 MMHG

## 2018-02-10 LAB
ADD MANUAL DIFF: NO
ANION GAP SERPL CALC-SCNC: 6 MMOL/L (ref 5–15)
BASOPHILS NFR BLD AUTO: 0.8 % (ref 0–2)
BUN SERPL-MCNC: 14 MG/DL (ref 7–18)
CALCIUM SERPL-MCNC: 9.2 MG/DL (ref 8.5–10.1)
CHLORIDE SERPL-SCNC: 98 MMOL/L (ref 98–107)
CO2 SERPL-SCNC: 31 MMOL/L (ref 21–32)
CREAT SERPL-MCNC: 1.3 MG/DL (ref 0.55–1.3)
EOSINOPHIL NFR BLD AUTO: 2.7 % (ref 0–3)
ERYTHROCYTE [DISTWIDTH] IN BLOOD BY AUTOMATED COUNT: 12.7 % (ref 11.6–14.8)
HCT VFR BLD CALC: 34.7 % (ref 42–52)
HGB BLD-MCNC: 11.4 G/DL (ref 14.2–18)
LYMPHOCYTES NFR BLD AUTO: 17.1 % (ref 20–45)
MCV RBC AUTO: 86 FL (ref 80–99)
MONOCYTES NFR BLD AUTO: 11.3 % (ref 1–10)
NEUTROPHILS NFR BLD AUTO: 68.1 % (ref 45–75)
PLATELET # BLD: 404 K/UL (ref 150–450)
POTASSIUM SERPL-SCNC: 4.4 MMOL/L (ref 3.5–5.1)
RBC # BLD AUTO: 4.04 M/UL (ref 4.7–6.1)
SODIUM SERPL-SCNC: 135 MMOL/L (ref 136–145)
WBC # BLD AUTO: 4.8 K/UL (ref 4.8–10.8)

## 2018-02-10 RX ADMIN — SODIUM CHLORIDE SCH MLS/HR: 0.9 INJECTION INTRAVENOUS at 01:03

## 2018-02-10 RX ADMIN — SODIUM CHLORIDE SCH MLS/HR: 0.9 INJECTION INTRAVENOUS at 08:21

## 2018-02-10 NOTE — OPERATIVE NOTE - DICTATED
DATE OF OPERATION:  02/07/2018



PREOPERATIVE DIAGNOSES:

1. Right renal mass.

2. Right adrenal mass.



OPERATION:  Lysis of multiple intra-abdominal adhesions, exposure of the

right kidney and right adrenalectomy.



POSTOPERATIVE DIAGNOSES:

1. Right renal mass.

2. Right adrenal mass.



OPERATED BY:  Mandeep Dawkins M.D.



ASSISTANT:  Robin Arias M.D.



FINDINGS:  Large renal mass and possible adrenal mass.



INDICATION FOR SURGERY:  The patient was seen in preoperative by Dr. Arias.  I was asked to see and help him to perform the surgery.  Consent

form and indications were explained to the patient by Dr. Arias.  The

patient was brought to the operating room, placed in right lateral

decubital position, prepped and draped in the standard fashion.  A mid low

incision was made under the umbilicus.  _____ was placed and secured in

the peritoneum.  After that, two additional 12 mm trocars and then 5

millimeter trocars were placed _____ exposing the right renal fossa, and

ascending and transverse colon were dissected and combined excision of the

peritoneum and retracted medially exposing renal fossa and vena cava.  I

have Dr. Arias to expose the kidney and he proceeded with right radical

nephrectomy.  I then performed a right adrenalectomy by using _____

graspers and Kocher clamps.  All the small vessel contributors to the

right adrenal was severed and adrenal was removed together with the rest

of the renal mass.  Estimated blood loss approximately 100 mL.  The

patient tolerated the procedure well.  Hemostasis with Surgicel.  No

evidence of active bleeding.  Bowel was repositioned in normal position

and left in place.  The wound was closed in multiple layers.  Staples for

the skin.









  ______________________________________________

  Mandeep Dawkins M.D.





DR:  YUE

D:  02/09/2018 14:45

T:  02/10/2018 00:03

JOB#:  [id number]

CC:

## 2018-02-10 NOTE — INTERNAL MED PROGRESS NOTE
Subjective


Date of Service:  Feb 10, 2018


Physician Name


Derik Hale


Attending Physician


Robin Arias


Allergies:  


Coded Allergies:  


     No Known Allergies (Unverified , 2/7/18)


ROS Limited/Unobtainable:  No


Constitutional:  Reports: no symptoms


HEENT:  Reports: no symptoms


Cardiovascular:  Reports: no symptoms


Respiratory:  Reports: no symptoms


Gastrointestinal/Abdominal:  Reports: abdominal pain


Genitourinary:  Reports: no symptoms


Neurologic/Psychiatric:  Reports: no symptoms


Subjective


64 YO M with history of right renal mass; S/P laparoscopic right radical 

nephrectomy.  Cover for Int Med-Dr Morris.  Await discharge home





Objective





Last Vital Signs








  Date Time  Temp Pulse Resp B/P (MAP) Pulse Ox O2 Delivery O2 Flow Rate FiO2


 


2/10/18 11:58 97.4 73 20 117/74 100   


 


2/10/18 11:58      Room Air  


 


2/7/18 15:58       3.0 











Laboratory Tests








Test


  2/10/18


06:21


 


White Blood Count


  4.8 K/UL


(4.8-10.8)


 


Red Blood Count


  4.04 M/UL


(4.70-6.10)  L


 


Hemoglobin


  11.4 G/DL


(14.2-18.0)  L


 


Hematocrit


  34.7 %


(42.0-52.0)  L


 


Mean Corpuscular Volume 86 FL (80-99)  


 


Mean Corpuscular Hemoglobin


  28.3 PG


(27.0-31.0)


 


Mean Corpuscular Hemoglobin


Concent 32.9 G/DL


(32.0-36.0)


 


Red Cell Distribution Width


  12.7 %


(11.6-14.8)


 


Platelet Count


  404 K/UL


(150-450)


 


Mean Platelet Volume


  7.1 FL


(6.5-10.1)


 


Neutrophils (%) (Auto)


  68.1 %


(45.0-75.0)


 


Lymphocytes (%) (Auto)


  17.1 %


(20.0-45.0)  L


 


Monocytes (%) (Auto)


  11.3 %


(1.0-10.0)  H


 


Eosinophils (%) (Auto)


  2.7 %


(0.0-3.0)


 


Basophils (%) (Auto)


  0.8 %


(0.0-2.0)


 


Sodium Level


  135 MMOL/L


(136-145)  L


 


Potassium Level


  4.4 MMOL/L


(3.5-5.1)


 


Chloride Level


  98 MMOL/L


()


 


Carbon Dioxide Level


  31 MMOL/L


(21-32)


 


Anion Gap


  6 mmol/L


(5-15)


 


Blood Urea Nitrogen


  14 mg/dL


(7-18)


 


Creatinine


  1.3 MG/DL


(0.55-1.30)


 


Estimat Glomerular Filtration


Rate 55.6 mL/min


(>60)


 


Glucose Level


  89 MG/DL


()


 


Calcium Level


  9.2 MG/DL


(8.5-10.1)

















Intake and Output  


 


 2/9/18 2/10/18





 19:00 07:00


 


Intake Total 690 ml 655 ml


 


Balance 690 ml 655 ml


 


  


 


Intake Oral 690 ml 600 ml


 


IV Total  55 ml


 


# Voids 3 2








Objective


General Appearance:  WD/WN, no apparent distress, mild distress


EENT:  PERRL/EOMI, normal ENT inspection, TMs normal


Neck:  non-tender, normal alignment, supple, normal inspection


Cardiovascular:  normal peripheral pulses, normal rate, regular rhythm, no 

gallop/murmur, no JVD


Respiratory/Chest:  chest wall non-tender, lungs clear, normal breath sounds, 

no respiratory distress, no accessory muscle use


Abdomen:  no organomegaly, no mass, decreased bowel sounds, tender


Extremities:  normal range of motion, non-tender


Edema:  trace edema


Neurologic:  CNs II-XII grossly normal, no motor/sensory deficits


Skin:  normal pigmentation, warm/dry





Assessment/Plan


Problem List:  


(1) Hematuria


(2) BPH (benign prostatic hyperplasia)


(3) Renal mass


Assessment & Plan:  S/P laparoscopic right radical nephrectomy-see urology note.





Assessment/Plan


D/C home today











DERIK HALE Feb 10, 2018 17:19

## 2018-02-10 NOTE — UROLOGY PROGRESS NOTE
Assessment/Plan


Assessment/Plan


hematuria hx


BPH hx


right renal mass, r/o RCC


POD # 3, lap right radical nephrectomy





doing well


f/u on path


dc home today


rx Bee


outpt f/u appt 1-2 weeks





Subjective


Allergies:  


Coded Allergies:  


     No Known Allergies (Unverified , 2/7/18)


Subjective


doing well, al reg diet, voiding, ambulating, (+) BM





Objective





Last 24 Hour Vital Signs








  Date Time  Temp Pulse Resp B/P (MAP) Pulse Ox O2 Delivery O2 Flow Rate FiO2


 


2/10/18 09:04 98.2 84 20 113/74 100   


 


2/10/18 08:00 98.2 84 20 113/74 100   


 


2/10/18 04:52 98.1 78 18 116/81 99   


 


2/10/18 00:10 98.1 66 19 130/74 98   


 


2/9/18 20:32 98.3 77 20 136/81 98   


 


2/9/18 16:00 98.4 79 19 136/87 100   


 


2/9/18 12:00 98.2 83 18 137/92 98   

















Intake and Output  


 


 2/9/18 2/10/18





 19:00 07:00


 


Intake Total 690 ml 655 ml


 


Balance 690 ml 655 ml


 


  


 


Intake Oral 690 ml 600 ml


 


IV Total  55 ml


 


# Voids 3 2











Microbiology








 Date/Time


Source Procedure


Growth Status


 


 


 2/7/18 06:30


Nasal Nares MRSA Culture - Final


NO METHICILLIN RESISTANT STAPH AUREUS... Complete








Current Medications








 Medications


  (Trade)  Dose


 Ordered  Sig/Asmita


 Route


 PRN Reason  Start Time


 Stop Time Status Last Admin


Dose Admin


 


 Cefazolin Sodium


 1 gm/Sodium


 Chloride  55 ml @ 


 110 mls/hr  Q8H


 IVP


   2/7/18 17:00


 2/14/18 16:59  2/10/18 08:21


 


 


 Finasteride


  (Proscar)  5 mg  DAILY


 ORAL


   2/9/18 09:00


 3/11/18 08:59  2/10/18 08:21


 


 


 Hydromorphone HCl


  (Dilaudid)  0.5 mg  Q3HR  PRN


 IVP


 For Pain  2/7/18 14:15


 2/14/18 14:14  2/8/18 19:38


 


 


 Temazepam


  (Restoril)  15 mg  HSPRN  PRN


 ORAL


 Insomnia  2/8/18 16:45


 2/15/18 16:44   


 





Laboratory Tests


2/10/18 06:21: 


White Blood Count 4.8, Red Blood Count 4.04L, Hemoglobin 11.4L, Hematocrit 34.7L

, Mean Corpuscular Volume 86, Mean Corpuscular Hemoglobin 28.3, Mean 

Corpuscular Hemoglobin Concent 32.9, Red Cell Distribution Width 12.7, Platelet 

Count 404, Mean Platelet Volume 7.1, Neutrophils (%) (Auto) 68.1, Lymphocytes (%

) (Auto) 17.1L, Monocytes (%) (Auto) 11.3H, Eosinophils (%) (Auto) 2.7, 

Basophils (%) (Auto) 0.8, Sodium Level 135L, Potassium Level 4.4, Chloride 

Level 98, Carbon Dioxide Level 31, Anion Gap 6, Blood Urea Nitrogen 14, 

Creatinine 1.3, Estimat Glomerular Filtration Rate 55.6, Glucose Level 89, 

Calcium Level 9.2


Height (Feet):  5


Height (Inches):  10.00


Weight (Pounds):  170


Objective


abdomen soft, ND, wounds clean











JUAN AMADOR Feb 10, 2018 10:07

## 2018-02-12 NOTE — DISCHARGE SUMMARY
Discharge Summary


Hospital Course


Date of Admission


Feb 7, 2018 at 06:19


Date of Discharge


Feb 10, 2018 at 14:40


Admitting Diagnosis


Right renal mass


Reason for Hospitalization:  electuve surgery


HPI


Augustin Vail is a 63 year old male who was admitted on Feb 7, 2018 at 06:19 

for Renal Mass.


patient was admitted for elective surgery


Consultations


dr Morris - IM


Procedures





s/p 2/7/18 by dr Arias


Laparoscopic hand assisted right radical nephrectomy.


 


s/p 2/7/18 by dr Barnett


Lysis of multiple intra-abdominal adhesions, exposure of the right kidney and 

right adrenalectomy.


Hospital Course


s/p surgery course of recovery uneventful 


pain management, pain controlled  


Collins dc , voided


ambulated 


diet advanced as tolerated, 


HH stable 


fup with pathology results 


stable for dc: pain controlled, ambulated, voided, tolerated diet


scripts for analgesics provided,  fup with surgeon in 1-2 wks 





FINAL DIAGNOSIS


-Right renal mass, 


-rule out renal cell carcinoma.


-possible right adrenal mass 


s/p  2/7/18 -


1. Laparoscopic hand assisted right radical nephrectomy.


2. Lysis of multiple intra-abdominal adhesions, exposure of the right kidney 

and right adrenalectomy.





Discharge Medications


Continued Medications:  


Finasteride (Finasteride) 5 Mg Tablet


5 MG ORAL DAILY, #30 TAB 0 Refills





Hydrocodone Bit/Acetaminophen 5-325* (Norco 5-325 Tablet*) 1 Each Tablet


1 TAB ORAL Q4H PRN for For Pain, #20 TAB











Discharge


Condition Upon Discharge:  stable


Discharge Disposition


Patient was discharged to Home (01)


Discharge Diagnoses:  





Discharge Instructions


Discharge Instructions


Special Instructions


I have been assigned to complete a D/C Summary on this account. I was not 

involved in the patient management











Diane Lopez NP ([place]) Feb 12, 2018 12:17